# Patient Record
Sex: FEMALE | Race: WHITE | NOT HISPANIC OR LATINO | Employment: OTHER | URBAN - METROPOLITAN AREA
[De-identification: names, ages, dates, MRNs, and addresses within clinical notes are randomized per-mention and may not be internally consistent; named-entity substitution may affect disease eponyms.]

---

## 2017-01-25 ENCOUNTER — AMBULATORY - HEALTHEAST (OUTPATIENT)
Dept: HEALTH INFORMATION MANAGEMENT | Facility: CLINIC | Age: 67
End: 2017-01-25

## 2021-09-14 ENCOUNTER — HOSPITAL ENCOUNTER (EMERGENCY)
Facility: CLINIC | Age: 71
Discharge: HOME | End: 2021-09-14
Attending: EMERGENCY MEDICINE | Admitting: EMERGENCY MEDICINE
Payer: COMMERCIAL

## 2021-09-14 ENCOUNTER — APPOINTMENT (OUTPATIENT)
Dept: RADIOLOGY | Facility: CLINIC | Age: 71
End: 2021-09-14
Payer: COMMERCIAL

## 2021-09-14 ENCOUNTER — APPOINTMENT (OUTPATIENT)
Dept: CT IMAGING | Facility: CLINIC | Age: 71
End: 2021-09-14
Attending: EMERGENCY MEDICINE
Payer: COMMERCIAL

## 2021-09-14 VITALS
TEMPERATURE: 99.3 F | RESPIRATION RATE: 20 BRPM | DIASTOLIC BLOOD PRESSURE: 99 MMHG | HEART RATE: 78 BPM | OXYGEN SATURATION: 98 % | WEIGHT: 175 LBS | SYSTOLIC BLOOD PRESSURE: 157 MMHG

## 2021-09-14 DIAGNOSIS — S00.83XA FACIAL CONTUSION, INITIAL ENCOUNTER: ICD-10-CM

## 2021-09-14 DIAGNOSIS — S20.211A CHEST WALL CONTUSION, RIGHT, INITIAL ENCOUNTER: ICD-10-CM

## 2021-09-14 DIAGNOSIS — S16.1XXA CERVICAL STRAIN, INITIAL ENCOUNTER: ICD-10-CM

## 2021-09-14 PROCEDURE — 71101 X-RAY EXAM UNILAT RIBS/CHEST: CPT | Mod: RT

## 2021-09-14 PROCEDURE — 72125 CT NECK SPINE W/O DYE: CPT

## 2021-09-14 PROCEDURE — 99284 EMERGENCY DEPT VISIT MOD MDM: CPT | Mod: 25

## 2021-09-15 NOTE — ED PROVIDER NOTES
EMERGENCY DEPARTMENT ENCOUNTER      NAME: Nicole Solis  AGE: 70 year old female  YOB: 1950  MRN: [id number]  EVALUATION DATE & TIME: 9/14/2021  8:19 PM    PCP: No primary care provider on file.    ED PROVIDER: Chapin Kim M.D.      Chief Complaint   Patient presents with     Fall         FINAL IMPRESSION:  Facial contusions  Chest wall contusion  Cervical strain      ED COURSE & MEDICAL DECISION MAKING:    Pertinent Labs & Imaging studies reviewed. (See chart for details)  70 year old female presents to the Emergency Department for evaluation of injuries after fall.  Patient was walking when she tripped over a tree root.  Patient primary reports her right-sided facial pain and some neck pain.  Patient has been ambulatory there after with minimal symptoms.  Examination does reveal abrasions to the right side of the face and some soft tissue swelling to the forehead area.  However there is no malocclusion, no bony facial tenderness.  Extraocular movements intact.  Patient does have some slight midline cervical tenderness.  Greatest area of discomfort is along the right lateral ribs.  Abdomen soft and nontender.  She does have a slight abrasion to the right knee but no soft tissue swelling and full range of motion.  We will proceed with imaging of the neck and right ribs.. Patient appears non toxic with stable vitals signs.   8:21 PM I met with the patient for the initial interview and physical examination. Discussed plan for treatment and workup in the ED.    9:42PM.  Cervical spine films and rib films are without evidence of acute injuries.  Patient cautioned to avoid sun exposure to her abrasions.  Tylenol recommended for discomfort.  Expectation is for recovery over the next 7 to 10 days.  At the conclusion of the encounter I discussed the results of all of the tests and the disposition. The questions were answered and return precautions provided. The patient or family acknowledged  understanding and was agreeable with the care plan.       PPE: Provider wore gloves, N95 mask, eye protection.    MEDICATIONS GIVEN IN THE EMERGENCY:  Medications - No data to display    NEW PRESCRIPTIONS STARTED AT TODAY'S ER VISIT  New Prescriptions    No medications on file          =================================================================    HPI    Patient information was obtained from: Patient    Use of Intrepreter: N/A       Nicole Solis is a 70 year old female  who presents to the ED for evaluation of fall.     Patient was on a walk around 1700 (~3.5 hours ago) when she tripped on a tree root. She sustained abrasions to her face and her right knee. Patient endorses discomfort in her neck and in her right ribs. Her rib pain is worsened with deep breathing. She took 3 ibuprofen for her pain. Last Tdap on file 2012. Denies any additional symptoms at this time.     REVIEW OF SYSTEMS   Constitutional:  Denies fever, chills  Respiratory:  Denies productive cough or increased work of breathing  Cardiovascular:  Denies chest pain, palpitations  GI:  Denies abdominal pain, nausea, vomiting, or change in bowel or bladder habits   Musculoskeletal: Positive for neck pain, right side pain. Denies any new muscle/joint swelling  Skin: Positive for abrasions. Denies rash   Neurologic:  Denies focal weakness  All systems negative except as marked.     PAST MEDICAL HISTORY:  History reviewed. No pertinent past medical history.    PAST SURGICAL HISTORY:  History reviewed. No pertinent surgical history.      CURRENT MEDICATIONS:    No current facility-administered medications for this encounter.  No current outpatient medications on file.    ALLERGIES:  Allergies   Allergen Reactions     Penicillins        FAMILY HISTORY:  History reviewed. No pertinent family history.    SOCIAL HISTORY:   Social History     Socioeconomic History     Marital status:      Spouse name: None     Number of children: None      Years of education: None     Highest education level: None   Occupational History     None   Tobacco Use     Smoking status: None   Substance and Sexual Activity     Alcohol use: None     Drug use: None     Sexual activity: None   Other Topics Concern     None   Social History Narrative     None     Social Determinants of Health     Financial Resource Strain:      Difficulty of Paying Living Expenses:    Food Insecurity:      Worried About Running Out of Food in the Last Year:      Ran Out of Food in the Last Year:    Transportation Needs:      Lack of Transportation (Medical):      Lack of Transportation (Non-Medical):    Physical Activity:      Days of Exercise per Week:      Minutes of Exercise per Session:    Stress:      Feeling of Stress :    Social Connections:      Frequency of Communication with Friends and Family:      Frequency of Social Gatherings with Friends and Family:      Attends [employer] Services:      Active Member of Clubs or Organizations:      Attends Club or Organization Meetings:      Marital Status:    Intimate Partner Violence:      Fear of Current or Ex-Partner:      Emotionally Abused:      Physically Abused:      Sexually Abused:        VITALS:  Patient Vitals for the past 24 hrs:   BP Temp Temp src Pulse Resp SpO2 Weight   09/14/21 1948 (!) 157/99 99.3  F (37.4  C) Oral 78 20 98 % 79.4 kg (175 lb)        PHYSICAL EXAM    Constitutional:  Awake, alert, mild distress  HENT:  Normocephalic. Bilateral external ears normal. Oropharynx moist. Abrasions to bridge of nose, right forehead and right zygomatic arch areas. Neck- Normal range of motion with no guarding, Mild midline cervical tenderness, Supple, No stridor.   Eyes:  PERRL, EOMI with no signs of entrapment, Conjunctiva normal, No discharge.   Respiratory:  Normal breath sounds, No respiratory distress, No wheezing.    Cardiovascular:  Normal heart rate, Normal rhythm, No appreciable rubs or gallops.   GI:  Soft, No tenderness, No  distension, No palpable masses  Musculoskeletal: Tenderness along the right inferolateral ribs, no abdominal tenderness. Intact distal pulses, No edema. Good range of motion in all major joints. No major deformities noted.  Integument: Superficial abrasion to the superior right knee. Warm, Dry, No erythema, No rash.   Neurologic:  Alert & oriented, Normal motor function, Normal sensory function, No focal deficits noted.   Psychiatric:  Affect normal, Judgment normal, Mood normal.     LAB:  All pertinent labs reviewed and interpreted.       RADIOLOGY:  Reviewed all pertinent imaging. Please see official radiology report.  Ribs XR, unilat 3 views + PA chest, right    Addendum Date: 9/14/2021    Addendum to chest x-ray and rib films 09/14/2021 More rib detail films arrived after the study was dictated. No other fractures noted on the additional views. The right breast was out of the field of view and has not been removed. Numerous right axillary clips. Prior cholecystectomy. The addendum ends here.     Result Date: 9/14/2021  EXAM: XR RIBS and CHEST RT 3VW LOCATION: [facility] DATE/TIME: 9/14/2021 8:42 PM INDICATION: fall, pain in right lower ribs COMPARISON: None.     IMPRESSION: No hydropneumothorax. Heart and pulmonary vascularity are normal. Severe S-shaped thoracic scoliosis, convex to the right with 2 separate, posterior spinal fusion rods. Right mastectomy with axillary clips.  Right rib detail films obtained with a marker. No rib fractures or suspicious bone lesions.    Cervical spine CT w/o contrast    Result Date: 9/14/2021  EXAM: CT CERVICAL SPINE W/O CONTRAST LOCATION: [facility] DATE/TIME: 9/14/2021 8:41 PM INDICATION: Neck trauma (Age >= 65y) COMPARISON: None. TECHNIQUE: Routine CT Cervical Spine without IV contrast. Multiplanar reformats. Dose reduction techniques were used. FINDINGS: VERTEBRA: There is slight reversal usual cervical lordosis. Cervical  vertebral body heights are maintained. There is multilevel fusion of the C7-C4 vertebral bodies. There is posterior fusion sirisha on the right at the T1 level which extends inferiorly off the field-of-view. The cervical spinal canal is mildly narrowed on a developmental basis. No acute cervical spine fracture. Mild rightward convex curvature of the cervical spine and mild leftward convex curvature of the visualized upper thoracic spine. CANAL/FORAMINA: There is a central disc extrusion at C3-C4 with mild to moderate narrowing of the central spinal canal. Moderate right neural foraminal stenosis at C4-C5. Left central disc protrusion at C5-C6 with moderate spinal canal stenosis. Severe right neural foraminal stenosis at C6-C7. PARASPINAL: No extraspinal abnormality.     IMPRESSION: 1.  No acute cervical spine fracture.      I, Roseline Goetz, am serving as a scribe to document services personally performed by Chapin Kim MD, based on my observation and the provider's statements to me. I, Chapin Kim MD attest that Roseline Goetz is acting in a scribe capacity, has observed my performance of the services and has documented them in accordance with my direction.    Chapin Kim M.D.  Emergency Medicine  [facility] EMERGENCY ROOM     Chapin Kim MD  09/14/21 [id number]

## 2021-09-15 NOTE — DISCHARGE INSTRUCTIONS
Protect your abrasions/contusions to your face from sunlight.  Apply Vaseline 2-3 times daily to help with healing.  Take Tylenol for discomfort.  Expect to be more achy the next few mornings.

## 2021-09-15 NOTE — ED TRIAGE NOTES
Pt was walking dog in park around 1700 today and tripped over a tree root falling down. Denies dizziness beforehand, denies LOC, denies blood thinners. Abrasions noted to forehead, bridge of nose, and right cheek. Complains of right side rib pain and right knee pain as well.

## 2021-12-05 ENCOUNTER — HEALTH MAINTENANCE LETTER (OUTPATIENT)
Age: 71
End: 2021-12-05

## 2022-09-25 ENCOUNTER — HEALTH MAINTENANCE LETTER (OUTPATIENT)
Age: 72
End: 2022-09-25

## 2023-01-30 ENCOUNTER — HEALTH MAINTENANCE LETTER (OUTPATIENT)
Age: 73
End: 2023-01-30

## 2023-12-23 ENCOUNTER — HEALTH MAINTENANCE LETTER (OUTPATIENT)
Age: 73
End: 2023-12-23

## 2025-02-15 ENCOUNTER — HEALTH MAINTENANCE LETTER (OUTPATIENT)
Age: 75
End: 2025-02-15